# Patient Record
Sex: MALE | Race: OTHER | NOT HISPANIC OR LATINO | URBAN - METROPOLITAN AREA
[De-identification: names, ages, dates, MRNs, and addresses within clinical notes are randomized per-mention and may not be internally consistent; named-entity substitution may affect disease eponyms.]

---

## 2018-10-20 ENCOUNTER — EMERGENCY (EMERGENCY)
Facility: HOSPITAL | Age: 28
LOS: 1 days | Discharge: HOME | End: 2018-10-20
Attending: EMERGENCY MEDICINE | Admitting: EMERGENCY MEDICINE
Payer: MEDICAID

## 2018-10-20 VITALS
DIASTOLIC BLOOD PRESSURE: 83 MMHG | OXYGEN SATURATION: 100 % | RESPIRATION RATE: 16 BRPM | SYSTOLIC BLOOD PRESSURE: 131 MMHG | HEART RATE: 70 BPM | TEMPERATURE: 98 F

## 2018-10-20 LAB
ALBUMIN SERPL ELPH-MCNC: 4.6 G/DL — SIGNIFICANT CHANGE UP (ref 3.3–5)
ALP SERPL-CCNC: 61 U/L — SIGNIFICANT CHANGE UP (ref 40–120)
ALT FLD-CCNC: 20 U/L — SIGNIFICANT CHANGE UP (ref 4–41)
AST SERPL-CCNC: 20 U/L — SIGNIFICANT CHANGE UP (ref 4–40)
BASOPHILS # BLD AUTO: 0.02 K/UL — SIGNIFICANT CHANGE UP (ref 0–0.2)
BASOPHILS NFR BLD AUTO: 0.3 % — SIGNIFICANT CHANGE UP (ref 0–2)
BILIRUB SERPL-MCNC: 0.8 MG/DL — SIGNIFICANT CHANGE UP (ref 0.2–1.2)
BUN SERPL-MCNC: 9 MG/DL — SIGNIFICANT CHANGE UP (ref 7–23)
CALCIUM SERPL-MCNC: 9.5 MG/DL — SIGNIFICANT CHANGE UP (ref 8.4–10.5)
CHLORIDE SERPL-SCNC: 99 MMOL/L — SIGNIFICANT CHANGE UP (ref 98–107)
CO2 SERPL-SCNC: 25 MMOL/L — SIGNIFICANT CHANGE UP (ref 22–31)
CREAT SERPL-MCNC: 1.08 MG/DL — SIGNIFICANT CHANGE UP (ref 0.5–1.3)
EOSINOPHIL # BLD AUTO: 0.13 K/UL — SIGNIFICANT CHANGE UP (ref 0–0.5)
EOSINOPHIL NFR BLD AUTO: 2.2 % — SIGNIFICANT CHANGE UP (ref 0–6)
GLUCOSE SERPL-MCNC: 100 MG/DL — HIGH (ref 70–99)
HCT VFR BLD CALC: 42.4 % — SIGNIFICANT CHANGE UP (ref 39–50)
HGB BLD-MCNC: 13.9 G/DL — SIGNIFICANT CHANGE UP (ref 13–17)
IMM GRANULOCYTES # BLD AUTO: 0.01 # — SIGNIFICANT CHANGE UP
IMM GRANULOCYTES NFR BLD AUTO: 0.2 % — SIGNIFICANT CHANGE UP (ref 0–1.5)
LYMPHOCYTES # BLD AUTO: 1.83 K/UL — SIGNIFICANT CHANGE UP (ref 1–3.3)
LYMPHOCYTES # BLD AUTO: 31.4 % — SIGNIFICANT CHANGE UP (ref 13–44)
MAGNESIUM SERPL-MCNC: 2.1 MG/DL — SIGNIFICANT CHANGE UP (ref 1.6–2.6)
MCHC RBC-ENTMCNC: 26.6 PG — LOW (ref 27–34)
MCHC RBC-ENTMCNC: 32.8 % — SIGNIFICANT CHANGE UP (ref 32–36)
MCV RBC AUTO: 81.2 FL — SIGNIFICANT CHANGE UP (ref 80–100)
MONOCYTES # BLD AUTO: 0.58 K/UL — SIGNIFICANT CHANGE UP (ref 0–0.9)
MONOCYTES NFR BLD AUTO: 9.9 % — SIGNIFICANT CHANGE UP (ref 2–14)
NEUTROPHILS # BLD AUTO: 3.26 K/UL — SIGNIFICANT CHANGE UP (ref 1.8–7.4)
NEUTROPHILS NFR BLD AUTO: 56 % — SIGNIFICANT CHANGE UP (ref 43–77)
NRBC # FLD: 0 — SIGNIFICANT CHANGE UP
PHOSPHATE SERPL-MCNC: 2.8 MG/DL — SIGNIFICANT CHANGE UP (ref 2.5–4.5)
PLATELET # BLD AUTO: 220 K/UL — SIGNIFICANT CHANGE UP (ref 150–400)
PMV BLD: 9.8 FL — SIGNIFICANT CHANGE UP (ref 7–13)
POTASSIUM SERPL-MCNC: 4.3 MMOL/L — SIGNIFICANT CHANGE UP (ref 3.5–5.3)
POTASSIUM SERPL-SCNC: 4.3 MMOL/L — SIGNIFICANT CHANGE UP (ref 3.5–5.3)
PROT SERPL-MCNC: 7.9 G/DL — SIGNIFICANT CHANGE UP (ref 6–8.3)
RBC # BLD: 5.22 M/UL — SIGNIFICANT CHANGE UP (ref 4.2–5.8)
RBC # FLD: 12.9 % — SIGNIFICANT CHANGE UP (ref 10.3–14.5)
SODIUM SERPL-SCNC: 138 MMOL/L — SIGNIFICANT CHANGE UP (ref 135–145)
TSH SERPL-MCNC: 1.22 UIU/ML — SIGNIFICANT CHANGE UP (ref 0.27–4.2)
WBC # BLD: 5.83 K/UL — SIGNIFICANT CHANGE UP (ref 3.8–10.5)
WBC # FLD AUTO: 5.83 K/UL — SIGNIFICANT CHANGE UP (ref 3.8–10.5)

## 2018-10-20 PROCEDURE — 99284 EMERGENCY DEPT VISIT MOD MDM: CPT

## 2018-10-20 NOTE — ED PROVIDER NOTE - PROGRESS NOTE DETAILS
labs and ekg unremarkable will dc with neuro f/u labs and ekg unremarkable will dc with neuro f/u and pt already has pmd scheduled for monday labs and ekg unremarkable will dc with neuro f/u for MRI and pt already has pmd scheduled for monday

## 2018-10-20 NOTE — ED ADULT TRIAGE NOTE - CHIEF COMPLAINT QUOTE
Pt. c/o intermittent numbness on left side of head radiating down left arm and leg x 2 weeks. States he started having cough and congestion when symptoms started- slight cough continued. Denies cp or sob.

## 2018-10-20 NOTE — ED PROVIDER NOTE - PHYSICAL EXAMINATION
Gen: Well appearing in NAD  Head: NC/AT  Neck: trachea midline  Resp:  No distress  Ext: no deformities  Neuro:  A&O appears non focal, CN3-12 intact, normal sensation, normal motor, no pronator drift, no gait abnormality   Skin:  Warm and dry as visualized  Psych:  Normal affect and mood

## 2018-10-20 NOTE — ED PROVIDER NOTE - OBJECTIVE STATEMENT
26yo male no significant pmh p/w with several weeks of intermittent left sided head, neck, arm and leg numbness. Currently does not have symptoms but has been happening more frequently so comes to ED> No weakness, no facial droop, no speech changes, novision changes, no gait changes. No chest pain, no SOB. Sometimes symptoms come on at night and resolve when he changes position. No trauma to back or neck 26yo male no significant pmh p/w with several weeks of intermittent left sided head, neck, arm and leg numbness. Currently does not have symptoms but has been happening more frequently so comes to ED> No headache, no weakness, no facial droop, no speech changes, novision changes, no gait changes. No chest pain, no SOB. Sometimes symptoms come on at night and resolve when he changes position. No trauma to back or neck

## 2018-10-20 NOTE — ED PROVIDER NOTE - MEDICAL DECISION MAKING DETAILS
26yo male intermittent numbness on left side of body, IN ED normal neuro exam, not symptomatic at present, not consistent with central cause, will get labs, electroyltes, tsh, neuro follow up 28yo male intermittent numbness on left side of body, IN ED normal neuro exam, not symptomatic at present,  will get labs, electroyltes, tsh, close neuro follow up for MRI with no need for emergent CT

## 2018-10-20 NOTE — ED PROVIDER NOTE - ATTENDING CONTRIBUTION TO CARE
26yo male no significant pmh p/w with several weeks of intermittent left sided head, neck, arm and leg numbness. Currently does not have symptoms but has been happening more frequently so comes to ED> No weakness, no facial droop, no speech changes, novision changes, no gait changes. No chest pain, no SOB. Sometimes symptoms come on at night and resolve when he changes position. No trauma to back or neck On exam: VSS lungs, heart, pulses, abdomen, neuro, extremities, skin, all normal on exam. EKG SB no acute changes. Plan: labs and if normal he can keep his medical appointment Monday and also see neuro for further work up